# Patient Record
Sex: FEMALE | Race: ASIAN | NOT HISPANIC OR LATINO | URBAN - METROPOLITAN AREA
[De-identification: names, ages, dates, MRNs, and addresses within clinical notes are randomized per-mention and may not be internally consistent; named-entity substitution may affect disease eponyms.]

---

## 2021-01-16 ENCOUNTER — EMERGENCY (EMERGENCY)
Facility: HOSPITAL | Age: 51
LOS: 1 days | Discharge: HOME | End: 2021-01-16
Attending: EMERGENCY MEDICINE
Payer: MEDICAID

## 2021-01-16 VITALS
WEIGHT: 167.55 LBS | TEMPERATURE: 98 F | DIASTOLIC BLOOD PRESSURE: 74 MMHG | RESPIRATION RATE: 18 BRPM | SYSTOLIC BLOOD PRESSURE: 145 MMHG | HEART RATE: 83 BPM | OXYGEN SATURATION: 98 %

## 2021-01-16 VITALS
DIASTOLIC BLOOD PRESSURE: 69 MMHG | HEART RATE: 76 BPM | OXYGEN SATURATION: 100 % | SYSTOLIC BLOOD PRESSURE: 132 MMHG | RESPIRATION RATE: 17 BRPM | TEMPERATURE: 98 F

## 2021-01-16 PROCEDURE — 96372 THER/PROPH/DIAG INJ SC/IM: CPT

## 2021-01-16 PROCEDURE — 99283 EMERGENCY DEPT VISIT LOW MDM: CPT

## 2021-01-16 PROCEDURE — 99284 EMERGENCY DEPT VISIT MOD MDM: CPT | Mod: 25

## 2021-01-16 RX ORDER — PENICILLIN V POTASSIUM 250 MG
1 TABLET ORAL
Qty: 21 | Refills: 0
Start: 2021-01-16 | End: 2021-01-22

## 2021-01-16 RX ORDER — FAMOTIDINE 10 MG/ML
40 INJECTION INTRAVENOUS ONCE
Refills: 0 | Status: COMPLETED | OUTPATIENT
Start: 2021-01-16 | End: 2021-01-16

## 2021-01-16 RX ORDER — MORPHINE SULFATE 50 MG/1
4 CAPSULE, EXTENDED RELEASE ORAL ONCE
Refills: 0 | Status: DISCONTINUED | OUTPATIENT
Start: 2021-01-16 | End: 2021-01-16

## 2021-01-16 RX ORDER — IBUPROFEN 200 MG
1 TABLET ORAL
Qty: 15 | Refills: 0
Start: 2021-01-16 | End: 2021-01-20

## 2021-01-16 RX ORDER — HYDROMORPHONE HYDROCHLORIDE 2 MG/ML
1 INJECTION INTRAMUSCULAR; INTRAVENOUS; SUBCUTANEOUS ONCE
Refills: 0 | Status: DISCONTINUED | OUTPATIENT
Start: 2021-01-16 | End: 2021-01-16

## 2021-01-16 RX ADMIN — MORPHINE SULFATE 4 MILLIGRAM(S): 50 CAPSULE, EXTENDED RELEASE ORAL at 11:51

## 2021-01-16 RX ADMIN — HYDROMORPHONE HYDROCHLORIDE 1 MILLIGRAM(S): 2 INJECTION INTRAMUSCULAR; INTRAVENOUS; SUBCUTANEOUS at 12:46

## 2021-01-16 NOTE — ED PROVIDER NOTE - CLINICAL SUMMARY MEDICAL DECISION MAKING FREE TEXT BOX
50 year old female presenting to the ED with complaints of right lower molar pain. Will order penicillin and pain management.

## 2021-01-16 NOTE — ED PROVIDER NOTE - PATIENT PORTAL LINK FT
You can access the FollowMyHealth Patient Portal offered by [facility] by registering at the following website: [email or web address]. By joining [employer]’s FollowMyHealth portal, you will also be able to view your health information using other applications (apps) compatible with our system. [phone]

## 2021-01-16 NOTE — ED PROVIDER NOTE - OBJECTIVE STATEMENT
50 year old female presenting to the ED with complaints of severe tooth pain today. She reports that she has two molars on the right lower jaw that are root canals which suddenly began having severe pain. She has tried to get in touch with her dentist but has been unable to get in contact. Denies fever, chills, cough, or any other acute complaints.

## 2021-01-16 NOTE — ED ADULT NURSE NOTE - NSIMPLEMENTINTERV_GEN_ALL_ED
Implemented All Universal Safety Interventions:  [place] to call system. Call bell, personal items and telephone within reach. Instruct patient to call for assistance. Room bathroom lighting operational. Non-slip footwear when patient is off stretcher. Physically safe environment: no spills, clutter or unnecessary equipment. Stretcher in lowest position, wheels locked, appropriate side rails in place.

## 2022-04-26 ENCOUNTER — EMERGENCY (EMERGENCY)
Facility: HOSPITAL | Age: 52
LOS: 1 days | Discharge: HOME | End: 2022-04-26
Attending: EMERGENCY MEDICINE
Payer: MEDICAID

## 2022-04-26 VITALS
TEMPERATURE: 99 F | HEART RATE: 107 BPM | OXYGEN SATURATION: 98 % | SYSTOLIC BLOOD PRESSURE: 127 MMHG | DIASTOLIC BLOOD PRESSURE: 86 MMHG | RESPIRATION RATE: 16 BRPM | WEIGHT: 156.97 LBS

## 2022-04-26 VITALS
SYSTOLIC BLOOD PRESSURE: 134 MMHG | RESPIRATION RATE: 18 BRPM | OXYGEN SATURATION: 96 % | TEMPERATURE: 100 F | HEART RATE: 106 BPM | DIASTOLIC BLOOD PRESSURE: 80 MMHG

## 2022-04-26 PROBLEM — Z78.9 OTHER SPECIFIED HEALTH STATUS: Chronic | Status: ACTIVE | Noted: 2021-01-29

## 2022-04-26 LAB
ALBUMIN SERPL ELPH-MCNC: 3.1 G/DL — LOW (ref 3.5–5)
ALP SERPL-CCNC: 62 U/L — SIGNIFICANT CHANGE UP (ref 40–120)
ALT FLD-CCNC: 30 U/L DA — SIGNIFICANT CHANGE UP (ref 10–60)
ANION GAP SERPL CALC-SCNC: 6 MMOL/L — SIGNIFICANT CHANGE UP (ref 5–17)
APPEARANCE UR: CLEAR — SIGNIFICANT CHANGE UP
AST SERPL-CCNC: 23 U/L — SIGNIFICANT CHANGE UP (ref 10–40)
BASOPHILS # BLD AUTO: 0.02 K/UL — SIGNIFICANT CHANGE UP (ref 0–0.2)
BASOPHILS NFR BLD AUTO: 0.3 % — SIGNIFICANT CHANGE UP (ref 0–2)
BILIRUB SERPL-MCNC: 0.5 MG/DL — SIGNIFICANT CHANGE UP (ref 0.2–1.2)
BILIRUB UR-MCNC: NEGATIVE — SIGNIFICANT CHANGE UP
BUN SERPL-MCNC: 10 MG/DL — SIGNIFICANT CHANGE UP (ref 7–18)
CALCIUM SERPL-MCNC: 9.4 MG/DL — SIGNIFICANT CHANGE UP (ref 8.4–10.5)
CHLORIDE SERPL-SCNC: 104 MMOL/L — SIGNIFICANT CHANGE UP (ref 96–108)
CO2 SERPL-SCNC: 26 MMOL/L — SIGNIFICANT CHANGE UP (ref 22–31)
COLOR SPEC: YELLOW — SIGNIFICANT CHANGE UP
CREAT SERPL-MCNC: 0.74 MG/DL — SIGNIFICANT CHANGE UP (ref 0.5–1.3)
DIFF PNL FLD: ABNORMAL
EGFR: 98 ML/MIN/1.73M2 — SIGNIFICANT CHANGE UP
EOSINOPHIL # BLD AUTO: 0.01 K/UL — SIGNIFICANT CHANGE UP (ref 0–0.5)
EOSINOPHIL NFR BLD AUTO: 0.1 % — SIGNIFICANT CHANGE UP (ref 0–6)
GLUCOSE SERPL-MCNC: 161 MG/DL — HIGH (ref 70–99)
GLUCOSE UR QL: NEGATIVE — SIGNIFICANT CHANGE UP
HCG SERPL-ACNC: <1 MIU/ML — SIGNIFICANT CHANGE UP
HCT VFR BLD CALC: 32.2 % — LOW (ref 34.5–45)
HGB BLD-MCNC: 10.3 G/DL — LOW (ref 11.5–15.5)
IMM GRANULOCYTES NFR BLD AUTO: 0.7 % — SIGNIFICANT CHANGE UP (ref 0–1.5)
KETONES UR-MCNC: NEGATIVE — SIGNIFICANT CHANGE UP
LEUKOCYTE ESTERASE UR-ACNC: NEGATIVE — SIGNIFICANT CHANGE UP
LYMPHOCYTES # BLD AUTO: 1.11 K/UL — SIGNIFICANT CHANGE UP (ref 1–3.3)
LYMPHOCYTES # BLD AUTO: 14.6 % — SIGNIFICANT CHANGE UP (ref 13–44)
MCHC RBC-ENTMCNC: 25.8 PG — LOW (ref 27–34)
MCHC RBC-ENTMCNC: 32 GM/DL — SIGNIFICANT CHANGE UP (ref 32–36)
MCV RBC AUTO: 80.5 FL — SIGNIFICANT CHANGE UP (ref 80–100)
MONOCYTES # BLD AUTO: 0.7 K/UL — SIGNIFICANT CHANGE UP (ref 0–0.9)
MONOCYTES NFR BLD AUTO: 9.2 % — SIGNIFICANT CHANGE UP (ref 2–14)
NEUTROPHILS # BLD AUTO: 5.73 K/UL — SIGNIFICANT CHANGE UP (ref 1.8–7.4)
NEUTROPHILS NFR BLD AUTO: 75.1 % — SIGNIFICANT CHANGE UP (ref 43–77)
NITRITE UR-MCNC: NEGATIVE — SIGNIFICANT CHANGE UP
NRBC # BLD: 0 /100 WBCS — SIGNIFICANT CHANGE UP (ref 0–0)
PH UR: 5 — SIGNIFICANT CHANGE UP (ref 5–8)
PLATELET # BLD AUTO: 203 K/UL — SIGNIFICANT CHANGE UP (ref 150–400)
POTASSIUM SERPL-MCNC: 3.4 MMOL/L — LOW (ref 3.5–5.3)
POTASSIUM SERPL-SCNC: 3.4 MMOL/L — LOW (ref 3.5–5.3)
PROT SERPL-MCNC: 7.5 G/DL — SIGNIFICANT CHANGE UP (ref 6–8.3)
PROT UR-MCNC: 100
RAPID RVP RESULT: SIGNIFICANT CHANGE UP
RBC # BLD: 4 M/UL — SIGNIFICANT CHANGE UP (ref 3.8–5.2)
RBC # FLD: 14.4 % — SIGNIFICANT CHANGE UP (ref 10.3–14.5)
SARS-COV-2 RNA SPEC QL NAA+PROBE: SIGNIFICANT CHANGE UP
SODIUM SERPL-SCNC: 136 MMOL/L — SIGNIFICANT CHANGE UP (ref 135–145)
SP GR SPEC: 1.02 — SIGNIFICANT CHANGE UP (ref 1.01–1.02)
UROBILINOGEN FLD QL: NEGATIVE — SIGNIFICANT CHANGE UP
WBC # BLD: 7.62 K/UL — SIGNIFICANT CHANGE UP (ref 3.8–10.5)
WBC # FLD AUTO: 7.62 K/UL — SIGNIFICANT CHANGE UP (ref 3.8–10.5)

## 2022-04-26 PROCEDURE — 0225U NFCT DS DNA&RNA 21 SARSCOV2: CPT

## 2022-04-26 PROCEDURE — 99285 EMERGENCY DEPT VISIT HI MDM: CPT | Mod: 25

## 2022-04-26 PROCEDURE — 71046 X-RAY EXAM CHEST 2 VIEWS: CPT | Mod: 26

## 2022-04-26 PROCEDURE — 36415 COLL VENOUS BLD VENIPUNCTURE: CPT

## 2022-04-26 PROCEDURE — 71046 X-RAY EXAM CHEST 2 VIEWS: CPT

## 2022-04-26 PROCEDURE — 87086 URINE CULTURE/COLONY COUNT: CPT

## 2022-04-26 PROCEDURE — 87040 BLOOD CULTURE FOR BACTERIA: CPT

## 2022-04-26 PROCEDURE — 96374 THER/PROPH/DIAG INJ IV PUSH: CPT | Mod: XU

## 2022-04-26 PROCEDURE — 74177 CT ABD & PELVIS W/CONTRAST: CPT | Mod: MA

## 2022-04-26 PROCEDURE — 84702 CHORIONIC GONADOTROPIN TEST: CPT

## 2022-04-26 PROCEDURE — 85025 COMPLETE CBC W/AUTO DIFF WBC: CPT

## 2022-04-26 PROCEDURE — 81001 URINALYSIS AUTO W/SCOPE: CPT

## 2022-04-26 PROCEDURE — 74177 CT ABD & PELVIS W/CONTRAST: CPT | Mod: 26,MA

## 2022-04-26 PROCEDURE — 99285 EMERGENCY DEPT VISIT HI MDM: CPT

## 2022-04-26 PROCEDURE — 80053 COMPREHEN METABOLIC PANEL: CPT

## 2022-04-26 RX ORDER — MORPHINE SULFATE 50 MG/1
4 CAPSULE, EXTENDED RELEASE ORAL ONCE
Refills: 0 | Status: DISCONTINUED | OUTPATIENT
Start: 2022-04-26 | End: 2022-04-26

## 2022-04-26 RX ORDER — AZITHROMYCIN 500 MG/1
1 TABLET, FILM COATED ORAL
Qty: 6 | Refills: 0
Start: 2022-04-26 | End: 2022-04-30

## 2022-04-26 RX ORDER — ACETAMINOPHEN 500 MG
2 TABLET ORAL
Qty: 30 | Refills: 0
Start: 2022-04-26

## 2022-04-26 RX ORDER — IBUPROFEN 200 MG
1 TABLET ORAL
Qty: 20 | Refills: 0
Start: 2022-04-26

## 2022-04-26 RX ADMIN — MORPHINE SULFATE 4 MILLIGRAM(S): 50 CAPSULE, EXTENDED RELEASE ORAL at 11:33

## 2022-04-26 NOTE — ED PROVIDER NOTE - NSFOLLOWUPINSTRUCTIONS_ED_ALL_ED_FT
Community-Acquired Pneumonia, Adult      Pneumonia is a lung infection that causes inflammation and the buildup of mucus and fluids in the lungs. This may cause coughing and difficulty breathing. Community-acquired pneumonia is pneumonia that develops in people who are not, and have not recently been, in a hospital or other health care facility.    Usually, pneumonia develops as a result of an illness that is caused by a virus, such as the common cold and the flu (influenza). It can also be caused by bacteria or fungi. While the common cold and influenza can pass from person to person (are contagious), pneumonia itself is not considered contagious.      What are the causes?     This condition may be caused by:  •Viruses.      •Bacteria.      •Fungi, such as molds or mushrooms.        What increases the risk?    The following factors may make you more likely to develop this condition:•Having certain medical conditions, such as:  •A long-term (chronic) disease, which may include chronic obstructive pulmonary disease (COPD), asthma, heart failure, cystic fibrosis, diabetes, kidney disease, sickle cell disease, and human immunodeficiency virus (HIV).      •A condition that increases the risk of breathing in (aspirating) mucus and other fluids from your mouth and nose.      •A weakened body defense system (immune system).        •Having had your spleen removed (splenectomy). The spleen is the organ that helps fight germs and infections.      •Not cleaning your teeth and gums well (poor dental hygiene).      •Using tobacco products.      •Traveling to places where germs that cause pneumonia are present.      •Being near certain animals, or animal habitats, that have germs that cause pneumonia.      •Being older than 65 years of age.        What are the signs or symptoms?    Symptoms of this condition include:  •A dry cough or a wet (productive) cough.      •A fever.      •Sweating or chills.      •Chest pain, especially when breathing deeply or coughing.      •Fast breathing, difficulty breathing, or shortness of breath.      •Tiredness (fatigue).      •Muscle aches.        How is this diagnosed?     This condition may be diagnosed based on your medical history or a physical exam. You may also have tests, including:  •Chest X-rays.      •Tests of the level of oxygen and other gases in your blood.    •Tests of:  •Your blood.      •Mucus from your lungs (sputum).      •Fluid around your lungs (pleural fluid).      •Your urine.        If your pneumonia is severe, other tests may be done to learn more about the cause.      How is this treated?    Treatment for this condition depends on many factors, such as the cause of your pneumonia, your medicines, and other medical conditions that you have.    For most adults, pneumonia may be treated at home. In some cases, treatment must happen in a hospital and may include:•Medicines that are given by mouth (orally) or through an IV, including:  •Antibiotic medicines, if bacteria caused the pneumonia.      •Medicines that kill viruses (antiviral medicines), if a virus caused the pneumonia.        •Oxygen therapy.      Severe pneumonia, although rare, may require the following treatments:  •Mechanical ventilation.This procedure uses a machine to help you breathe if you cannot breathe well on your own or maintain a safe level of blood oxygen.      •Thoracentesis. This procedure removes any buildup of pleural fluid to help with breathing.        Follow these instructions at home:     Medicines     •Take over-the-counter and prescription medicines only as told by your health care provider.      •Take cough medicine only if you have trouble sleeping. Cough medicine can prevent your body from removing mucus from your lungs.      •If you were prescribed an antibiotic medicine, take it as told by your health care provider. Do not stop taking the antibiotic even if you start to feel better.        Lifestyle                   • Do not drink alcohol.      • Do not use any products that contain nicotine or tobacco, such as cigarettes, e-cigarettes, and chewing tobacco. If you need help quitting, ask your health care provider.      •Eat a healthy diet. This includes plenty of vegetables, fruits, whole grains, low-fat dairy products, and lean protein.      General instructions     •Rest a lot and get at least 8 hours of sleep each night.      •Sleep in a partly upright position at night. Place a few pillows under your head or sleep in a reclining chair.      •Return to your normal activities as told by your health care provider. Ask your health care provider what activities are safe for you.      •Drink enough fluid to keep your urine pale yellow. This helps to thin the mucus in your lungs.      •If your throat is sore, gargle with a salt–water mixture 3–4 times a day or as needed. To make a salt–water mixture, completely dissolve ½–1 tsp (3–6 g) of salt in 1 cup (237 mL) of warm water.      •Keep all follow-up visits as told by your health care provider. This is important.        How is this prevented?    You can lower your risk of developing community-acquired pneumonia by:•Getting the pneumonia vaccine. There are different types and schedules of pneumonia vaccines. Ask your health care provider which option is best for you. Consider getting the pneumonia vaccine if:  •You are older than 65 years of age.      •You are 19–65 years of age and are receiving cancer treatment, have chronic lung disease, or have other medical conditions that affect your immune system. Ask your health care provider if this applies to you.        •Getting your influenza vaccine every year. Ask your health care provider which type of vaccine is best for you.      •Getting regular dental checkups.      •Washing your hands often with soap and water for at least 20 seconds. If soap and water are not available, use hand .        Contact a health care provider if you have:    •A fever.      •Trouble sleeping because you cannot control your cough with cough medicine.        Get help right away if:    •Your shortness of breath becomes worse.      •Your chest pain increases.      •Your sickness becomes worse, especially if you are an older adult or have a weak immune system.      •You cough up blood.      These symptoms may represent a serious problem that is an emergency. Do not wait to see if the symptoms will go away. Get medical help right away. Call your local emergency services (911 in the [place] Do not drive yourself to the hospital.       Summary    •Pneumonia is an infection of the lungs.      •Community-acquired pneumonia develops in people who have not been in the hospital. It can be caused by bacteria, viruses, or fungi.      •This condition may be treated with antibiotics or antiviral medicines.      •Severe pneumonia may require a hospital stay and treatment to help with breathing.      This information is not intended to replace advice given to you by your health care provider. Make sure you discuss any questions you have with your health care provider.      Document Revised: 09/29/2020 Document Reviewed: 09/29/2020    Elsevier Patient Education © 2022 [employer]

## 2022-04-26 NOTE — ED PROVIDER NOTE - PATIENT PORTAL LINK FT
You can access the FollowMyHealth Patient Portal offered by [facility] by registering at the following website: [email or web address]. By joining [email or web address]’s FollowMyHealth portal, you will also be able to view your health information using other applications (apps) compatible with our system.

## 2022-04-26 NOTE — ED PROVIDER NOTE - PROGRESS NOTE DETAILS
Pt improved.  PNA seen on X-ray and CT so will start azithromycin.  No shortness of breath and VSS on room air.  Advised strict return precautions and PMD f/u.  Per her request, prescribed acetaminophen and ibuprofen.

## 2022-04-26 NOTE — ED ADULT NURSE NOTE - OBJECTIVE STATEMENT
Pt. c/o fever since Friday with some abdominal pain. Pt. denies cough. at home COVID test came back negative.

## 2022-04-26 NOTE — ED PROVIDER NOTE - OBJECTIVE STATEMENT
51 year old female with no PMHx presenting to the ED with chief complaint of fever for the past 5 days. Patient states that her fever peaked at a 103F over the 5 days and that she checked for COVID-19 at home but was negative. She feels general weakness, mild cough, and mild headache. Patient took Ibuprofen at 4am but not tylenol today but without help. She doesn't have any recent sick contacts or recent intake of antibiotics.  Patient denies vomiting, diarrhea, dysuria, shortness of breath, chest pain, and neck stiffness.  NKDA.

## 2022-04-26 NOTE — ED PROVIDER NOTE - CLINICAL SUMMARY MEDICAL DECISION MAKING FREE TEXT BOX
51 year old female presenting with 5 days of fever. Will give labs, chest X-ray, urinalysis, RVP, and reassess.
n/a

## 2022-04-30 ENCOUNTER — EMERGENCY (EMERGENCY)
Facility: HOSPITAL | Age: 52
LOS: 1 days | Discharge: HOME | End: 2022-04-30
Attending: EMERGENCY MEDICINE
Payer: MEDICAID

## 2022-04-30 VITALS
DIASTOLIC BLOOD PRESSURE: 95 MMHG | SYSTOLIC BLOOD PRESSURE: 146 MMHG | HEIGHT: 63 IN | RESPIRATION RATE: 18 BRPM | TEMPERATURE: 98 F | HEART RATE: 96 BPM | WEIGHT: 158.29 LBS | OXYGEN SATURATION: 97 %

## 2022-04-30 VITALS
SYSTOLIC BLOOD PRESSURE: 153 MMHG | RESPIRATION RATE: 18 BRPM | DIASTOLIC BLOOD PRESSURE: 89 MMHG | OXYGEN SATURATION: 97 % | HEART RATE: 87 BPM | TEMPERATURE: 98 F

## 2022-04-30 LAB
ALBUMIN SERPL ELPH-MCNC: 3.2 G/DL — LOW (ref 3.5–5)
ALP SERPL-CCNC: 70 U/L — SIGNIFICANT CHANGE UP (ref 40–120)
ALT FLD-CCNC: 34 U/L DA — SIGNIFICANT CHANGE UP (ref 10–60)
ANION GAP SERPL CALC-SCNC: 4 MMOL/L — LOW (ref 5–17)
AST SERPL-CCNC: 23 U/L — SIGNIFICANT CHANGE UP (ref 10–40)
BASOPHILS # BLD AUTO: 0.04 K/UL — SIGNIFICANT CHANGE UP (ref 0–0.2)
BASOPHILS NFR BLD AUTO: 0.6 % — SIGNIFICANT CHANGE UP (ref 0–2)
BILIRUB SERPL-MCNC: 0.3 MG/DL — SIGNIFICANT CHANGE UP (ref 0.2–1.2)
BUN SERPL-MCNC: 11 MG/DL — SIGNIFICANT CHANGE UP (ref 7–18)
CALCIUM SERPL-MCNC: 9.6 MG/DL — SIGNIFICANT CHANGE UP (ref 8.4–10.5)
CHLORIDE SERPL-SCNC: 105 MMOL/L — SIGNIFICANT CHANGE UP (ref 96–108)
CO2 SERPL-SCNC: 31 MMOL/L — SIGNIFICANT CHANGE UP (ref 22–31)
CREAT SERPL-MCNC: 0.72 MG/DL — SIGNIFICANT CHANGE UP (ref 0.5–1.3)
CULTURE RESULTS: NO GROWTH — SIGNIFICANT CHANGE UP
D DIMER BLD IA.RAPID-MCNC: 380 NG/ML DDU — HIGH
EGFR: 102 ML/MIN/1.73M2 — SIGNIFICANT CHANGE UP
EOSINOPHIL # BLD AUTO: 0.18 K/UL — SIGNIFICANT CHANGE UP (ref 0–0.5)
EOSINOPHIL NFR BLD AUTO: 2.5 % — SIGNIFICANT CHANGE UP (ref 0–6)
FLUAV AG NPH QL: SIGNIFICANT CHANGE UP
FLUBV AG NPH QL: SIGNIFICANT CHANGE UP
GLUCOSE SERPL-MCNC: 112 MG/DL — HIGH (ref 70–99)
HCG SERPL-ACNC: <1 MIU/ML — SIGNIFICANT CHANGE UP
HCT VFR BLD CALC: 34.8 % — SIGNIFICANT CHANGE UP (ref 34.5–45)
HGB BLD-MCNC: 10.8 G/DL — LOW (ref 11.5–15.5)
IMM GRANULOCYTES NFR BLD AUTO: 4.5 % — HIGH (ref 0–1.5)
LYMPHOCYTES # BLD AUTO: 1.91 K/UL — SIGNIFICANT CHANGE UP (ref 1–3.3)
LYMPHOCYTES # BLD AUTO: 26.9 % — SIGNIFICANT CHANGE UP (ref 13–44)
MCHC RBC-ENTMCNC: 25.2 PG — LOW (ref 27–34)
MCHC RBC-ENTMCNC: 31 GM/DL — LOW (ref 32–36)
MCV RBC AUTO: 81.1 FL — SIGNIFICANT CHANGE UP (ref 80–100)
MONOCYTES # BLD AUTO: 0.42 K/UL — SIGNIFICANT CHANGE UP (ref 0–0.9)
MONOCYTES NFR BLD AUTO: 5.9 % — SIGNIFICANT CHANGE UP (ref 2–14)
NEUTROPHILS # BLD AUTO: 4.24 K/UL — SIGNIFICANT CHANGE UP (ref 1.8–7.4)
NEUTROPHILS NFR BLD AUTO: 59.6 % — SIGNIFICANT CHANGE UP (ref 43–77)
NRBC # BLD: 0 /100 WBCS — SIGNIFICANT CHANGE UP (ref 0–0)
PLATELET # BLD AUTO: 344 K/UL — SIGNIFICANT CHANGE UP (ref 150–400)
POTASSIUM SERPL-MCNC: 3.6 MMOL/L — SIGNIFICANT CHANGE UP (ref 3.5–5.3)
POTASSIUM SERPL-SCNC: 3.6 MMOL/L — SIGNIFICANT CHANGE UP (ref 3.5–5.3)
PROT SERPL-MCNC: 8.1 G/DL — SIGNIFICANT CHANGE UP (ref 6–8.3)
RBC # BLD: 4.29 M/UL — SIGNIFICANT CHANGE UP (ref 3.8–5.2)
RBC # FLD: 14.3 % — SIGNIFICANT CHANGE UP (ref 10.3–14.5)
SARS-COV-2 RNA SPEC QL NAA+PROBE: SIGNIFICANT CHANGE UP
SODIUM SERPL-SCNC: 140 MMOL/L — SIGNIFICANT CHANGE UP (ref 135–145)
SPECIMEN SOURCE: SIGNIFICANT CHANGE UP
TROPONIN I, HIGH SENSITIVITY RESULT: 3.1 NG/L — SIGNIFICANT CHANGE UP
WBC # BLD: 7.11 K/UL — SIGNIFICANT CHANGE UP (ref 3.8–10.5)
WBC # FLD AUTO: 7.11 K/UL — SIGNIFICANT CHANGE UP (ref 3.8–10.5)

## 2022-04-30 PROCEDURE — 84484 ASSAY OF TROPONIN QUANT: CPT

## 2022-04-30 PROCEDURE — 87637 SARSCOV2&INF A&B&RSV AMP PRB: CPT

## 2022-04-30 PROCEDURE — 93005 ELECTROCARDIOGRAM TRACING: CPT

## 2022-04-30 PROCEDURE — 85025 COMPLETE CBC W/AUTO DIFF WBC: CPT

## 2022-04-30 PROCEDURE — 71275 CT ANGIOGRAPHY CHEST: CPT | Mod: MA

## 2022-04-30 PROCEDURE — 36415 COLL VENOUS BLD VENIPUNCTURE: CPT

## 2022-04-30 PROCEDURE — 85379 FIBRIN DEGRADATION QUANT: CPT

## 2022-04-30 PROCEDURE — 84702 CHORIONIC GONADOTROPIN TEST: CPT

## 2022-04-30 PROCEDURE — 71275 CT ANGIOGRAPHY CHEST: CPT | Mod: 26,MA

## 2022-04-30 PROCEDURE — 99285 EMERGENCY DEPT VISIT HI MDM: CPT | Mod: 25

## 2022-04-30 PROCEDURE — 80053 COMPREHEN METABOLIC PANEL: CPT

## 2022-04-30 PROCEDURE — 99285 EMERGENCY DEPT VISIT HI MDM: CPT

## 2022-04-30 NOTE — ED PROVIDER NOTE - PROGRESS NOTE DETAILS
(Susan) s/o to fu CTA  CTA - w/o PE; continued PNA  Dc w/ c/w abx and PCP fu  Discussed indications for patient return to ED. Patient understood.

## 2022-04-30 NOTE — ED PROVIDER NOTE - PATIENT PORTAL LINK FT
You can access the FollowMyHealth Patient Portal offered by [facility] by registering at the following website: [email or web address]. By joining [employer]’s FollowMyHealth portal, you will also be able to view your health information using other applications (apps) compatible with our system.

## 2022-04-30 NOTE — ED ADULT NURSE NOTE - OBJECTIVE STATEMENT
Pt arrived to ED accompanied by daughter, who states her mom was seen here on Tuesday for cough and SOB   Since Thursday, started coughing up blood  Pt states has shortness of breath when coughing only , no more fever now, came to ED worried for bloody cough  Denies recent travel outside the country or sick contacts

## 2022-04-30 NOTE — ED PROVIDER NOTE - PHYSICAL EXAMINATION
General: pt lying in stretcher, appears stated age and is not in distress  HEENT: AT/NC, pink conjunctiva, anicteric sclerae, EOMI, PERRLA, TMs smooth, grey, intact, with normal landmarks, nasal mucosa pink, no discharge, turbinates not enlarged; moist mucus membranes, tongue well-papillated, good dentition; posterior pharynx shows no erythema or exudates;   Neck: supple, full ROM, trachea midline, no JVD, no cervical LAD, no midline ttp or stepoffs  Lungs: symmetric excursion, b/l clear vesicular breath sounds with no wheezes, crackles, or rhonchi  Heart: rrr, S1, S2 normal; no S3 or S4; no murmurs or rubs  Abd: normal bowel sounds; soft, nontender; negative McBurney's point tenderness, negative Deleon's sign, no rebound, no guarding, spleen non-palpable; no hepatomegaly, no masses  Back: no midline spinal tenderness or stepoffs, no costovertebral angle tenderness  Extremities: no clubbing, cyanosis, or edema; no palpable deformities or fractures  Skin: good turgor; no rashes, petechiae, ecchymoses, or jaundice  Pulses: radial, posterior tibialis (PT), dorsalis pedis (DP) all 2+ & symmetric  Neuro: awake, alert, responsive; oriented to person, place and time; cranial nerves intact, EOMI, intact jaw movement, intact facial sensation, no facial asymmetry, hearing intact; no nystagmus, tongue midline; Motor: Normal tone in upper and lower extremities bilaterally strength 5/5; Sensory: intact to pinprick and light touch; Cerebellar: finger-to-nose intact; normal steady gait; negative Romberg’s sign; DTR: biceps, triceps, patellar, 2+, no pronator drift General: pt lying in stretcher, appears stated age and is not in distress  HEENT: AT/NC, pink conjunctiva, anicteric sclerae, EOMI, PERRLA, mmm  Neck: supple, full ROM, trachea midline, no JVD, no cervical LAD, no midline ttp or stepoffs  Lungs: symmetric excursion, b/l clear vesicular breath sounds with no wheezes, crackles, or rhonchi  Heart: rrr, S1, S2 normal; no S3 or S4; no murmurs or rubs  Abd: normal bowel sounds; soft, nontender; negative McBurney's point tenderness, negative Deleon's sign, no rebound, no guarding, spleen non-palpable; no hepatomegaly, no masses  Back: no midline spinal tenderness or stepoffs, no costovertebral angle tenderness  Extremities: no clubbing, cyanosis, or edema; no palpable deformities or fractures  Skin: good turgor; no rashes, petechiae, ecchymoses, or jaundice  Pulses: radial, posterior tibialis (PT), dorsalis pedis (DP) all 2+ & symmetric  Neuro: awake, alert, responsive; oriented to person, place and time; cranial nerves intact, EOMI, intact jaw movement, intact facial sensation, no facial asymmetry, hearing intact; ; Motor: Normal tone in upper and lower extremities bilaterally strength 5/5; Sensory: intact

## 2022-04-30 NOTE — ED PROVIDER NOTE - CLINICAL SUMMARY MEDICAL DECISION MAKING FREE TEXT BOX
Pt w/ aforementioned presentation concerning for but not limited to worsened pna, PE   Will get labs, imaging, treat symptoms, monitor and reassess.    Pt signed out to Dr. Davis pending CT angio to r/o PE

## 2022-05-01 LAB
CULTURE RESULTS: SIGNIFICANT CHANGE UP
CULTURE RESULTS: SIGNIFICANT CHANGE UP
SPECIMEN SOURCE: SIGNIFICANT CHANGE UP
SPECIMEN SOURCE: SIGNIFICANT CHANGE UP

## 2022-09-23 ENCOUNTER — EMERGENCY (EMERGENCY)
Facility: HOSPITAL | Age: 52
LOS: 1 days | Discharge: HOME | End: 2022-09-23
Admitting: EMERGENCY MEDICINE

## 2022-09-23 VITALS
OXYGEN SATURATION: 100 % | DIASTOLIC BLOOD PRESSURE: 83 MMHG | RESPIRATION RATE: 20 BRPM | TEMPERATURE: 98 F | HEART RATE: 74 BPM | SYSTOLIC BLOOD PRESSURE: 123 MMHG

## 2022-09-23 PROCEDURE — 99284 EMERGENCY DEPT VISIT MOD MDM: CPT

## 2022-09-23 RX ORDER — IBUPROFEN 200 MG
1 TABLET ORAL
Qty: 6 | Refills: 0
Start: 2022-09-23 | End: 2022-09-24

## 2022-09-23 RX ORDER — OXYCODONE AND ACETAMINOPHEN 5; 325 MG/1; MG/1
1 TABLET ORAL
Qty: 8 | Refills: 0
Start: 2022-09-23 | End: 2022-09-24

## 2022-09-23 NOTE — ED PROVIDER NOTE - ENMT, MLM
Airway patent, Nasal mucosa clear. Mouth with normal mucosa. Throat has no vesicles, no oropharyngeal exudates and uvula is midline. No LAD. No facial swelling.

## 2022-09-23 NOTE — CONSULT NOTE ADULT - SUBJECTIVE AND OBJECTIVE BOX
Patient is a 51y old  Female who presents with a chief complaint of LL dental pain.    HPI: Pt presented to [facility] ED with LL dental pain. Pt reports pain started about a month ago after she saw a dentist for a cleaning. Pt reports dentist cleaned under her bridge on the LL after which she began feeling pain on biting and throbbing.      PAST MEDICAL & SURGICAL HISTORY: Anemia    No significant past surgical history    MEDICATIONS  (STANDING): Ferrous sulfate      Allergies NKDA      Vital Signs Last 24 Hrs  T(C): 36.8 (23 Sep 2022 12:28), Max: 36.8 (23 Sep 2022 12:28)  T(F): 98.2 (23 Sep 2022 12:28), Max: 98.2 (23 Sep 2022 12:28)  HR: 74 (23 Sep 2022 12:28) (74 - 74)  BP: 123/83 (23 Sep 2022 12:28) (123/83 - 123/83)  BP(mean): --  RR: 20 (23 Sep 2022 12:28) (20 - 20)  SpO2: 100% (23 Sep 2022 12:28) (100% - 100%)    Parameters below as of 23 Sep 2022 12:28  Patient On (Oxygen Delivery Method): room air      EOE:  TMJ ( - ) clicks                    ( - ) pops                    ( - ) crepitus             Mandible FROM             Facial bones and MOM grossly intact             ( - ) trismus             ( - ) LAD             ( - ) swelling             ( - ) asymmetry             ( + ) palpation - along LL mandible in area of #18-x-20 bridge             ( - ) SOB             ( - ) dysphagia             ( - ) LOC    IOE:  permanent dentition: grossly intact, partially edentulous           tongue/FOM WNL           labial/buccal mucosa WNL           ( + ) percussion - #18, #20           ( + ) palpation - #18, #20           ( - ) swelling   Bridge #18-x-20  Pt reports pain especially along tooth #18.    *DENTAL RADIOGRAPHS: Pan taken and interpreted.   Bridge - 2-x-4-5-x-7; 12-x-15;18-x-20,  #2, 4, 5, 13, 20 - RCT  #18 - incomplete RCT    ASSESSMENT: #18 incomplete RCT. Recommend that pt see outpatient dentist and/or endodontist to complete RCT on #18. Pt understood and all questions answered.     RECOMMENDATIONS:  1) Pain meds as per Med team  2) Dental F/U with outpatient dentist or endodontist to complete RCT on #18 for comprehensive dental care.   3) Dental F/U with outpatient dentist for comprehensive dental care.     Noris Díaz DDS #[id number] Patient is a 51y old  Female who presents with a chief complaint of LL dental pain.    HPI: Pt presented to [facility] ED with LL dental pain. Pt reports pain started about a month ago after she saw a dentist for a cleaning. Pt reports dentist cleaned under her bridge on the LL after which she began feeling pain on biting and throbbing.      PAST MEDICAL & SURGICAL HISTORY: Anemia    No significant past surgical history    MEDICATIONS  (STANDING): Ferrous sulfate      Allergies NKDA      Vital Signs Last 24 Hrs  T(C): 36.8 (23 Sep 2022 12:28), Max: 36.8 (23 Sep 2022 12:28)  T(F): 98.2 (23 Sep 2022 12:28), Max: 98.2 (23 Sep 2022 12:28)  HR: 74 (23 Sep 2022 12:28) (74 - 74)  BP: 123/83 (23 Sep 2022 12:28) (123/83 - 123/83)  BP(mean): --  RR: 20 (23 Sep 2022 12:28) (20 - 20)  SpO2: 100% (23 Sep 2022 12:28) (100% - 100%)    Parameters below as of 23 Sep 2022 12:28  Patient On (Oxygen Delivery Method): room air      EOE:  TMJ ( - ) clicks                    ( - ) pops                    ( - ) crepitus             Mandible FROM             Facial bones and MOM grossly intact             ( - ) trismus             ( - ) LAD             ( - ) swelling             ( - ) asymmetry             ( + ) palpation - along LL mandible in area of #18-x-20 bridge             ( - ) SOB             ( - ) dysphagia             ( - ) LOC    IOE:  permanent dentition: grossly intact, partially edentulous           tongue/FOM WNL           labial/buccal mucosa WNL           ( + ) percussion - #18, #20           ( + ) palpation - #18, #20           ( - ) swelling   Bridge #18-x-20  Pt reports pain especially along tooth #18.    *DENTAL RADIOGRAPHS: Pan taken and interpreted.   Bridge - 2-x-4-5-x-7; 12-x-15;18-x-20,  #2, 4, 5, 13, 20 - RCT  #18 - incomplete RCT    ASSESSMENT: #18 incomplete RCT. Recommend that pt see outpatient dentist and/or endodontist to complete RCT on #18. Pt understood and all questions answered.     RECOMMENDATIONS:  1) Pain meds as per Med team  2) Dental F/U with outpatient dentist or endodontist to complete RCT on #18 for comprehensive dental care.   3) Dental F/U with outpatient dentist for comprehensive dental care.     Noris Díaz DDS #[id number] Patient is a 51y old  Female who presents with a chief complaint of LL dental pain.    HPI: Pt presented to [facility] ED with LL dental pain. Pt reports pain started about a month ago after she saw a dentist for a cleaning. Pt reports dentist cleaned under her bridge on the LL after which she began feeling pain on biting and throbbing.      PAST MEDICAL & SURGICAL HISTORY: Anemia    No significant past surgical history    MEDICATIONS  (STANDING): Ferrous sulfate      Allergies NKDA      Vital Signs Last 24 Hrs  T(C): 36.8 (23 Sep 2022 12:28), Max: 36.8 (23 Sep 2022 12:28)  T(F): 98.2 (23 Sep 2022 12:28), Max: 98.2 (23 Sep 2022 12:28)  HR: 74 (23 Sep 2022 12:28) (74 - 74)  BP: 123/83 (23 Sep 2022 12:28) (123/83 - 123/83)  BP(mean): --  RR: 20 (23 Sep 2022 12:28) (20 - 20)  SpO2: 100% (23 Sep 2022 12:28) (100% - 100%)    Parameters below as of 23 Sep 2022 12:28  Patient On (Oxygen Delivery Method): room air      EOE:  TMJ ( - ) clicks                    ( - ) pops                    ( - ) crepitus             Mandible FROM             Facial bones and MOM grossly intact             ( - ) trismus             ( - ) LAD             ( - ) swelling             ( - ) asymmetry             ( + ) palpation - along LL mandible in area of #18-x-20 bridge             ( - ) SOB             ( - ) dysphagia             ( - ) LOC    IOE:  permanent dentition: grossly intact, partially edentulous           tongue/FOM WNL           labial/buccal mucosa WNL           ( + ) percussion - #18, #20           ( + ) palpation - #18, #20           ( - ) swelling   Bridge #18-x-20  Pt reports pain especially along tooth #18.    *DENTAL RADIOGRAPHS: Pan taken and interpreted.   Bridge - 2-x-4-5-x-7; 12-x-15;18-x-20,  #2, 4, 5, 13, 20 - RCT  #18 - incomplete RCT    ASSESSMENT: #18 incomplete RCT. Recommend that pt see outpatient dentist and/or endodontist to complete RCT on #18. Pt understood and all questions answered.     RECOMMENDATIONS:  1) Pain meds as per Med team  2) Dental F/U with outpatient dentist or endodontist to complete RCT on #18 for comprehensive dental care.   3) Dental F/U with outpatient dentist for comprehensive dental care.     Noris Díaz DDS #[id number]

## 2022-09-23 NOTE — ED PROVIDER NOTE - CLINICAL SUMMARY MEDICAL DECISION MAKING FREE TEXT BOX
50 y/o female with pmhx of anemia presents to ED c/o lower dental pain x few days. Pt last saw her dentist 1 month ago for a cleaning and told she had food impaction of left lower bridge placed years ago after a root canal. Pt took Tylenol this morning with minimal relief. Pt admits to mild swelling inside the mouth. no edema or abscess on exam. plan to consult dental, pain well controlled at this time deferring medication 52 y/o female with pmhx of anemia presents to ED c/o lower dental pain x few days. Pt last saw her dentist 1 month ago for a cleaning and told she had food impaction of left lower bridge placed years ago after a root canal. Pt took Tylenol this morning with minimal relief. Pt admits to mild swelling inside the mouth. no edema or abscess on exam. plan to consult dental, pain well controlled at this time deferring medication

## 2022-09-23 NOTE — ED PROVIDER NOTE - NSFOLLOWUPINSTRUCTIONS_ED_ALL_ED_FT
Follow up with your dentist  Take Percocet 325/5 once every 6 hours as needed for severe pain -- causes drowsiness; DO NOT drink alcohol, drive, or operate heavy machinery with this medication.     Worsening, continued or new concerning symptoms return to the emergency department.

## 2022-09-23 NOTE — ED PROVIDER NOTE - OBJECTIVE STATEMENT
50 y/o female with pmhx of anemia presents to ED c/o lower dental pain x few days. Pt last saw her dentist 1 month ago for a cleaning and told she had food impaction of left lower bridge placed years ago after a root canal. Pt took Tylenol this morning with minimal relief. Pt admits to mild swelling inside the mouth. No facial swelling. No fevers, chills, redness, difficulty swallowing. 52 y/o female with pmhx of anemia presents to ED c/o lower dental pain x few days. Pt last saw her dentist 1 month ago for a cleaning and told she had food impaction of left lower bridge placed years ago after a root canal. Pt took Tylenol this morning with minimal relief. Pt admits to mild swelling inside the mouth. No facial swelling. No fevers, chills, redness, difficulty swallowing.

## 2022-09-23 NOTE — ED PROVIDER NOTE - PATIENT PORTAL LINK FT
You can access the FollowMyHealth Patient Portal offered by [facility] by registering at the following website: [email or web address]. By joining [employer]’s FollowMyHealth portal, you will also be able to view your health information using other applications (apps) compatible with our system. You can access the FollowMyHealth Patient Portal offered by [facility] by registering at the following website: [email or web address]. By joining [employer]’s FollowMyHealth portal, you will also be able to view your health information using other applications (apps) compatible with our system. You can access the FollowMyHealth Patient Portal offered by [facility] by registering at the following website: [email or web address]. By joining [employer]’s FollowMyHealth portal, you will also be able to view your health information using other applications (apps) compatible with our system.

## 2023-06-30 PROBLEM — Z00.00 ENCOUNTER FOR PREVENTIVE HEALTH EXAMINATION: Status: ACTIVE | Noted: 2023-06-30

## 2023-08-17 ENCOUNTER — APPOINTMENT (OUTPATIENT)
Dept: GASTROENTEROLOGY | Facility: CLINIC | Age: 53
End: 2023-08-17

## 2023-09-05 NOTE — ED ADULT NURSE NOTE - NS ED NURSE RECORD ANOTHER HT AND WT
[Tendon Sheath] : tendon sheath [Left] : of the left [Other: ____] : [unfilled] [___ cc    6mg] :  Betamethasone (Celestone) ~Vcc of 6mg [___ cc    1%] : Lidocaine ~Vcc of 1%  [___ cc    0.25%] : Bupivacaine (Marcaine) ~Vcc of 0.25%  [] : Patient tolerated procedure well [Call if redness, pain or fever occur] : call if redness, pain or fever occur [Apply ice for 15min out of every hour for the next 12-24 hours as tolerated] : apply ice for 15 minutes out of every hour for the next 12-24 hours as tolerated [Previous OTC use and PT nontherapeutic] : patient has tried OTC's including aspirin, Ibuprofen, Aleve, etc or prescription NSAIDS, and/or exercises at home and/or physical therapy without satisfactory response [Patient had decreased mobility in the joint] : patient had decreased mobility in the joint [Risks, benefits, alternatives discussed / Verbal consent obtained] : the risks benefits, and alternatives have been discussed, and verbal consent was obtained [Prior failure or difficult injection] : prior failure or difficult injection [All ultrasound images have been permanently captured and stored accordingly in our picture archiving and communication system] : All ultrasound images have been permanently captured and stored accordingly in our picture archiving and communication system [Visualization of the needle and placement of injection was performed without complication] : visualization of the needle and placement of injection was performed without complication Yes

## 2024-05-21 NOTE — ED PROVIDER NOTE - PRINCIPAL DIAGNOSIS
Hemoptysis
Initiate Treatment: Bactrim 400 mg-80 mg tablet \\nQuantity: 28.0 Tablet\\nSig: Take one tab po BID X14 days
Detail Level: Zone
Render In Strict Bullet Format?: No

## 2024-08-13 NOTE — ED ADULT NURSE NOTE - PAIN: BODY LOCATION
Called pt, no answer; left message informing pt I was calling to remind pt of her in office EAWV on 8/15/24 and to return my call if she had any questions; left my name and number.   
[email or web address]

## 2024-11-12 ENCOUNTER — TRANSCRIPTION ENCOUNTER (OUTPATIENT)
Age: 54
End: 2024-11-12

## 2025-04-29 NOTE — ED ADULT NURSE NOTE - NS_NURSE_DISC_TEACHING_YN_ED_ALL_ED
----------------------------------------------------------------------------------------------------------  [facility]  Psychiatry Progress Note  Hospital Day #14     Interim History:     The patient's care was discussed with the treatment team and chart notes were reviewed.    Patient ID: Anastasiya Simon is a 62 year old female with a previous psychiatric diagnosis of schizophrenia and polysubstance use admitted from [facility] on 04/15/2025 due to concern for SI and psychosis in the context of psychosocial stressors including living situation and argument with family member.    Vitals: Temp: 97.6  F (36.4  C) Temp  Min: 97.6  F (36.4  C)  Max: 97.6  F (36.4  C)  SpO2: 99 % SpO2  Min: 99 %  Max: 100 %  Pulse: 86 Pulse  Min: 86  Max: 91  BP: (!) 146/80 Systolic (24hrs), Av , Min:128 , Max:167   Diastolic (24hrs), Av, Min:75, Max:82  Sleep: 4.5 hours (25 0657)  Scheduled medications: Took all scheduled medications as prescribed.  Psychiatric PRN medications:   Last 24H PRN:     hydrOXYzine HCl (ATARAX) tablet 25 mg, 25 mg at 25 0435    loperamide (IMODIUM) capsule 2 mg, 2 mg at 25 2100    melatonin tablet 3 mg, 3 mg at 25 0435    Staff Report:   AM: Pt BG before breakfast was 95 and before lunch 118. When trying to get blood sample for BG, pt kept pulling her finger away. Denies SI and AVH. Pt hopes she's not in the hospital long. Pt mumbling and getting off topic during conversation. Pt discussed getting kicked out of places and people not letting them stay with them. Pt selectively social with select peers. Ate meals in dining area. Denies diarrhea or incontinence.    PM: Alert and oriented, able to communicate needs. Visible in milieu, most time spent watching tv. Social and interactive with peers and staff members. Appeared flat but pleasant on approach. Cooperative and medication compliant. Denied anxiety or depression,  "denied SI/HI, contracted for safety. ADLs WNL, no pain/discomfort. Adequate food and fluid intake. Imodium given at HS per request for reported diarrhea. Pt complained of feeling anxious, restless, and unable to sleep. Hydroxyzine and Melatonin given-effective. Pt slept after. Pt appears disheveled. Slept for 4.5 total hours.   Blood Glucose:  4/28 1800: 116  4/28 2100: 142  4/29 0200: 135  4/29 0800: 154     Subjective:     Patient Interview:  Pt was interviewed her room. Pt was sitting in her bed.     Asked pt how she is doing. Pt reports feeling \"drowsy\" and that her hair is bothering her. Pt ate breakfast this morning. Asked if there are any concerns. She feels dehydrated. Asked about diarrhea, pt says she takes loperamide then it doesn't happen. She says diarrhea happens any time in the day. Pt reports last night she knew it was going to happen soon so she took a pill for it to prevent diarrhea. Pt took the new medication, Abilify, this morning. Asked how she is feeling, she feels fine. It may be too soon to see any side effects. Reminded her about cross tapering with the old medication, Zyprexa. Informed her it should help with sedation. She is agreeable to medication changes.     Pt asked how long it will take to knows where she will be going. Let pt know that referrals have been made and waiting for the group [facility] to respond. Pt asked the time and asked when May is. Informed her that Thursday is May 1st. She asked if we would know by Thursday, reiterated that we need to wait for the group [facility] review and decision. Pt said she can find her own place. Reminded pt that she has been committed and her  will want to find a solid plan before discharging. Pt wants to call the  or friend who will help her. Informed pt we need to get approval from the [facility] for a provisional discharge. Attending told her that we can consider her finding her own place, but [facility] might not support this. " "Informed pt that our team is looking for something more permanent. Pt confirms that everyone on the unit is treating her okay. No other questions or concerns at this time.     Pt is agreeable with care plan.   Objective:     Vitals:  BP (!) 146/80 (BP Location: Right arm)   Pulse 86   Temp 97.6  F (36.4  C) (Oral)   Resp 20   Ht 1.651 m (5' 5\")   Wt 71.7 kg (158 lb)   LMP 10/22/2013   SpO2 99%   BMI 26.29 kg/m      Allergies:  Allergies   Allergen Reactions    Tetracycline Unknown     It happened when pt was a baby       Current Medications:  Scheduled:  Current Facility-Administered Medications   Medication Dose Route Frequency Provider Last Rate Last Admin    ARIPiprazole (ABILIFY) tablet 5 mg  5 mg Oral Daily Joceline Dela Cruz MD   5 mg at 04/29/25 0748    calcium carbonate (TUMS) chewable tablet 500 mg  500 mg Oral Daily PRN Tanja Gonzalez MD   500 mg at 04/24/25 1750    glucose gel 15-30 g  15-30 g Oral Q15 Min PRN Katina Armendariz MD        Or    dextrose 50 % injection 25-50 mL  25-50 mL Intravenous Q15 Min PRN Katina Armendariz MD        Or    glucagon injection 1 mg  1 mg Subcutaneous Q15 Min PRN Katina Armendariz MD        divalproex sodium extended-release (DEPAKOTE ER) 24 hr tablet 1,000 mg  1,000 mg Oral At Bedtime Tanja Gonzalez MD   1,000 mg at 04/28/25 2101    gabapentin (NEURONTIN) capsule 300 mg  300 mg Oral TID PRN Tanja Gonzalez MD   300 mg at 04/22/25 2017    glipiZIDE (GLUCOTROL XL) 24 hr tablet 10 mg  10 mg Oral Daily with breakfast Katina Meza PA   10 mg at 04/29/25 0748    hydrOXYzine HCl (ATARAX) tablet 25 mg  25 mg Oral Q4H PRN Lucy Chandler MD   25 mg at 04/29/25 0435    insulin aspart (NovoLOG) injection (RAPID ACTING)  1-7 Units Subcutaneous TID AC Katina Meza PA   2 Units at 04/27/25 1806    insulin aspart (NovoLOG) injection (RAPID ACTING)  1-5 Units Subcutaneous At Bedtime Katina Meza PA   2 Units at " 04/26/25 2129    loperamide (IMODIUM) capsule 2 mg  2 mg Oral 4x Daily PRN Tanja Gonzalez MD   2 mg at 04/28/25 2100    magnesium sulfate (EPSOM SALT) granules 1 Tablespoonful  1 Tablespoonful Topical Daily PRN Tanja Gonzalez MD        melatonin tablet 3 mg  3 mg Oral At Bedtime PRN Katina Armendariz MD   3 mg at 04/29/25 0435    metFORMIN (GLUCOPHAGE) tablet 1,000 mg  1,000 mg Oral BID w/meals Charlotte Jasmine PA-C   1,000 mg at 04/29/25 0748    multivitamin w/minerals (THERA-VIT-M) tablet 1 tablet  1 tablet Oral Daily Charlotte Jasmine PA-C   1 tablet at 04/29/25 0748    OLANZapine (zyPREXA) tablet 10 mg  10 mg Oral TID PRN Lucy Chandler MD        Or    OLANZapine (zyPREXA) injection 10 mg  10 mg Intramuscular TID PRN Lucy Chandler MD        OLANZapine zydis (zyPREXA) ODT tab 15 mg  15 mg Oral At Bedtime Tanja Gonzalez MD   15 mg at 04/28/25 2101    OLANZapine zydis (zyPREXA) ODT tab 5 mg  5 mg Oral Daily Tanja Gonzalez MD   5 mg at 04/29/25 0748    pantoprazole (PROTONIX) EC tablet 40 mg  40 mg Oral QAM AC Lucy Chandler MD   40 mg at 04/29/25 0748    tolterodine ER (DETROL LA) 24 hr capsule 2 mg  2 mg Oral Daily Tanja Gonzalez MD   2 mg at 04/29/25 0748     PRN:  Current Facility-Administered Medications   Medication Dose Route Frequency Provider Last Rate Last Admin    ARIPiprazole (ABILIFY) tablet 5 mg  5 mg Oral Daily Joceline Dela Cruz MD   5 mg at 04/29/25 0748    calcium carbonate (TUMS) chewable tablet 500 mg  500 mg Oral Daily PRN Tanja Gonzalez MD   500 mg at 04/24/25 1750    glucose gel 15-30 g  15-30 g Oral Q15 Min PRN Katina Armendariz MD        Or    dextrose 50 % injection 25-50 mL  25-50 mL Intravenous Q15 Min PRN Katina Armendariz MD        Or    glucagon injection 1 mg  1 mg Subcutaneous Q15 Min PRN Katina Armendariz MD        divalproex sodium extended-release (DEPAKOTE ER) 24 hr tablet 1,000 mg  1,000  mg Oral At Bedtime Tanja Gonzalez MD   1,000 mg at 04/28/25 2101    gabapentin (NEURONTIN) capsule 300 mg  300 mg Oral TID PRN Tanja Gonzalez MD   300 mg at 04/22/25 2017    glipiZIDE (GLUCOTROL XL) 24 hr tablet 10 mg  10 mg Oral Daily with breakfast Katina Meza PA   10 mg at 04/29/25 0748    hydrOXYzine HCl (ATARAX) tablet 25 mg  25 mg Oral Q4H PRN Lucy Chandler MD   25 mg at 04/29/25 0435    insulin aspart (NovoLOG) injection (RAPID ACTING)  1-7 Units Subcutaneous TID AC Katina Meza PA   2 Units at 04/27/25 1806    insulin aspart (NovoLOG) injection (RAPID ACTING)  1-5 Units Subcutaneous At Bedtime Katina Meza PA   2 Units at 04/26/25 2129    loperamide (IMODIUM) capsule 2 mg  2 mg Oral 4x Daily PRN Tanja Gonzalez MD   2 mg at 04/28/25 2100    magnesium sulfate (EPSOM SALT) granules 1 Tablespoonful  1 Tablespoonful Topical Daily PRN Tajna Gonzalez MD        melatonin tablet 3 mg  3 mg Oral At Bedtime PRN Katina Armendariz MD   3 mg at 04/29/25 0435    metFORMIN (GLUCOPHAGE) tablet 1,000 mg  1,000 mg Oral BID w/meals Charlotte Jasmine PA-C   1,000 mg at 04/29/25 0748    multivitamin w/minerals (THERA-VIT-M) tablet 1 tablet  1 tablet Oral Daily Charlotte Jasmine PA-C   1 tablet at 04/29/25 0748    OLANZapine (zyPREXA) tablet 10 mg  10 mg Oral TID PRN Lucy Chandler MD        Or    OLANZapine (zyPREXA) injection 10 mg  10 mg Intramuscular TID PRN Lucy Chandler MD        OLANZapine zydis (zyPREXA) ODT tab 15 mg  15 mg Oral At Bedtime Tanja Gonzalez MD   15 mg at 04/28/25 2101    OLANZapine zydis (zyPREXA) ODT tab 5 mg  5 mg Oral Daily Tanja Gonzalez MD   5 mg at 04/29/25 0748    pantoprazole (PROTONIX) EC tablet 40 mg  40 mg Oral QA AC Lucy Chandler MD   40 mg at 04/29/25 0748    tolterodine ER (DETROL LA) 24 hr capsule 2 mg  2 mg Oral Daily Tanja Gonzalez MD   2 mg at 04/29/25 0748     Labs and  Imaging:  New results:   Recent Results (from the past 24 hours)   Basic metabolic panel    Collection Time: 04/28/25  8:31 AM   Result Value Ref Range    Sodium 141 135 - 145 mmol/L    Potassium 4.0 3.4 - 5.3 mmol/L    Chloride 101 98 - 107 mmol/L    Carbon Dioxide (CO2) 28 22 - 29 mmol/L    Anion Gap 12 7 - 15 mmol/L    Urea Nitrogen 19.4 8.0 - 23.0 mg/dL    Creatinine 0.64 0.51 - 0.95 mg/dL    GFR Estimate >90 >60 mL/min/1.73m2    Calcium 9.5 8.8 - 10.4 mg/dL    Glucose 94 70 - 99 mg/dL   Magnesium    Collection Time: 04/28/25  8:31 AM   Result Value Ref Range    Magnesium 1.6 (L) 1.7 - 2.3 mg/dL   Phosphorus    Collection Time: 04/28/25  8:31 AM   Result Value Ref Range    Phosphorus 4.6 (H) 2.5 - 4.5 mg/dL   Extra Purple Top Tube    Collection Time: 04/28/25  8:31 AM   Result Value Ref Range    Hold Specimen JIC    Glucose by meter    Collection Time: 04/28/25 11:42 AM   Result Value Ref Range    GLUCOSE BY METER POCT 118 (H) 70 - 99 mg/dL   Glucose by meter    Collection Time: 04/28/25  5:40 PM   Result Value Ref Range    GLUCOSE BY METER POCT 116 (H) 70 - 99 mg/dL   Glucose by meter    Collection Time: 04/28/25  8:55 PM   Result Value Ref Range    GLUCOSE BY METER POCT 142 (H) 70 - 99 mg/dL   Glucose by meter    Collection Time: 04/29/25  1:55 AM   Result Value Ref Range    GLUCOSE BY METER POCT 135 (H) 70 - 99 mg/dL   Glucose by meter    Collection Time: 04/29/25  7:41 AM   Result Value Ref Range    GLUCOSE BY METER POCT 154 (H) 70 - 99 mg/dL     New Results  Valproic Acid Free & Total: 67  Valproic Acid Free & Total STAT: 75.6  Phosphorus: 4.6 (H)  Magnesium: 1.6 (L)  BMP: unremarkable (4/28)    Data this admission:  CBC: WNL. WBC: 7.6, RBC: 4.37, Hb: 13.0, Platelets: 316.  CMP: Unremarkable (4/22) AST: 13, ALT: 10, Creatinine: 0.65.  UDS: Negative  RASHIDA: 0.00 (4/13)  HbA1c: 10.1%  TSH: 2.40  Vit: B12: 812  Folate: 13.7    Imaging:  Bladder Scan: Completed on 4/22     Mental Status Exam:     Oriented to:   "Grossly oriented.  General: Alert and awake. Pt was sitting on her bed.   Appearance:  Appears younger than stated age. Grooming is unkempt. Wears glasses and own personal clothing.   Behavior/Attitude:  Cooperative, calm, engaged with questioning.   Eye Contact: Appropriate.   Psychomotor: No evidence of tics, dystonia, or tardive dyskinesia. No catatonia present.  Speech: Appropriate rate/tone/volume.   Language: Fluent in English with appropriate syntax and vocabulary.  Mood:  \"drowsy\"  Affect:  Euthymic. Congruent with mood. Appropriate.   Thought Process:  Linear and goal directed.  Thought Content:  Denies SI/SIB/AVH.  Associations:  intact  Insight:  limited   Judgment:  limited   Impulse control: fair  Attention Span:  adequate for conversation  Concentration:  grossly intact  Recent and Remote Memory:  Not formally assessed. Pt reports memory issues. Staff notes forgetfulness.   Fund of Knowledge: Average.  Muscle Strength and Tone: Normal.  Gait and Station: Normal.     Psychiatric Assessment     Anastasiya Simon is a 62 year old female previously diagnosed with schizophrenia and polysubstance use disorder who presented with suicidal ideation, disorganization, and inability to care for self. Most recent psychiatric hospitalization was 03/20/2019 for disorganization and suicidal ideation. Significant symptoms on admission include increasing suicidal ideation, disorganization, erratic behavior such as filling buckets with ammonia and mixing with bleach, substance use, auditory hallucinations, and paranoia. The MSE on admission was pertinent for AVH, paranoia, limited insight into MH. Today on interview Anastasiya denies SI and reports she had only made suicidal statements to her sisters previously as she did not want to move into a group home.  Psychological contributions to mental health presentation include maladaptive coping through substance use and limited insight into MH. Social factors contributing to " mental health presentation include interpersonal conflicts and unstable living environment. Protective factors include support from two sisters and absence of previous suicide attempts. On 4/16 we resumed Zyprexa 20 mg and increased Depakote to 1000 mg to address the ongoing psychosis, Tolterodine and PRN loperamide were started to address incontinence and diarrhea. On 4/18, we added PRN gabapentin 300 mg for peripheral neuropathy. On 4/28, we will begin cross-titration to Abilify from Zyprexa due to increased appetite noted while on Zyprexa. Started Abilify 5 mg PO daily in the AM.    In summary, the patient's reported symptoms of SI, erractic behavior, AVH, and paranoia in the context of psychosocial stressers are consistent with decompensated schizophrenia and polysubstance use disorder.  She will likely benefit from medication optimization and outpatient MH referrals this admission.     Psychiatric Plan by Diagnosis      Today's changes:  - None    # Schizophrenia  Medications:  - olanzapine 5 mg in the morning, 15 mg HS  - depakote 1000 mg HS  - Abilify 5 mg PO daily in the morning    #Polysubstance Use Disorder  - CD consult once psychotic sx more stable, if pt amenable    2. Pertinent Labs/Monitoring:  - Labs done 04/13 at OSH. CBC wnl, glucose elevated to 332, BMP wnl.  - UDS negative on 04/13  - 4/16 HbA1c: 10.1  - Depakote Levels: 75.6    3. Additional Plans:  - Patient will be treated in therapeutic milieu with appropriate individual and group therapies as described.   - Consult with Internal Medicine for diabetes management.      Psychiatric Hospital Course:      Anastasiya Simon was admitted to [facility] on a 72 hour hold (started 4/13/25 21:35) from [facility] on 04/15/25.   Medications:  Continued Medications:  Pt seen by psychiatry consult at [facility] who started zyprexa 20 mg at bedtime and depakote 500 mg at bedtime. Both of these medications were continued on admission to  [facility].   New Medications:  Started loperamide for diarrhea  Started tolterodine for urinary incontinence  Started gabapentin for peripheral neuropathy    4/16: Increased depakote from 500 mg to 1000 mg HS for mood regulation  4/16: Increased metformin from 500 mg to 1000 mg BID per medicine consult  4/16: Zyprexa: 5 mg in the morning and 15 mg HS  4/16: Started PRN Loperamide 2 mg 4x/day for diarrhea  4/16: Started Tolterodine 2 mg daily for urinary incontinence  4/18: Started Gabapentin 300 mg PRN 3x/day for peripheral neuropathy  4/20: Started glipizide 24 hr tablet 5 mg daily for better glycemic control per medicine consult  4/25: Increased glipizide XL from 5mg to 10mg per medicine consult.  4/28: Started Abilify 5 mg PO daily in the morning    The risks, benefits, alternatives, and side effects were discussed and understood by the patient and other caregivers.     Medical Assessment and Plan     Medical diagnoses to be addressed this admission:      #Type 2 Diabetes Mellitis with Complications  - PTA metformin 1000 mg BID with meals  - BID glucose checks; hypoglycemia protocol  - PRN gabapentin 300 mg 3x/day for peripheral neuropathy  - Insulin aspart (Novolog) injection 1-5 units HS  - Insulin aspart (Novolog) injection 3x/day 1-7 units before meals  - Glipizide 24 hr tablet 10 mg daily with breakfast for better glycemic control  - Internal Medicine Consult  - Nutrition Consult  - Moderate Consistent Carb Diet  - Given patient is starting insulin, please notify medicine team of any impending discharge at least 2-3 days prior in order to have a safe management plan for her diabetes.    #Diarrhea  - Loperamide 2 mg PRN 4x/day    #Urinary Incontinence  - Tolterodine 2 mg daily    Medical course:  Patient was physically examined by the ED prior to being transferred to the unit and was found to be medically stable and appropriate for admission.    Consults:   4/16: Internal Medicine consulted for management of  diabetes and blood pressure.   4/16: Increased metformin to 1000 mg BID.   4/18: Started PRN gabapentin 300 mg 3x/day for peripheral neuropathy.   4/18: Internal Medicine started Novolog injection 1-5 units HS and Novolog injection 3x/day 1-7 units before meals for better glycemic control. Given patient is starting insulin, please notify medicine team of any impending discharge at least 2-3 days prior in order to have a safe management plan for her diabetes.  4/19: Internal Medicine increased to high intensity sliding scale. Novolog injection 1-7 units HS. Novolog injection 3x/day 1-10 units before meals for better glycemic control.   4/20: Started PO glipizide 24 hr tablet 5 mg daily with breakfast.   4/21: Started pt on a consistent carb diet.   4/22: Nutrition Consult for diabetes management.   4/24: Note from Medicine: Please send patient with glucometer on discharge. Diabetes education should be ordered prior to discharge.  4/25: Increase Glipizide XL to 10 mg daily starting on 4/26. Continue Metformin 1000 mg BID. Decreased to moderate intensity sliding scale. Novolog injection 1-5 units HS. Novolog injection 3x/day 1-7 units before meals.. Hopefully can discontinue in coming days. Moderate consistent CHO diet.   4/27: Continues to require 7U of insulin and is not quite ready to wean off of her sliding scale insulin yet. Will continue to monitor and adjust medications with goal of having her wean off sliding scale insulin prior to discharge.     Checklist     Legal Status:   MI Commitment and [facility]: Fariha  File Number: [id number]  Start and expiration date of commitment: 04/24/2025 - 10/24/2025  Castellanos meds: Zyprexa, Haldol, Abilify, Risperdal    Safety Assessment:   Behavioral Orders   Procedures    Code 1 - Restrict to Unit    Routine Programming     As clinically indicated    Status 15     Every 15 minutes.    Suicide precautions: Suicide Risk: MODERATE; Clinical rationale to override  score: modification to the care environment, response to medication, lack of access to a plan for self-harm, Exhibiting Suicidal/self-harm behaviors or thoughts     Patients on Suicide Precautions should have a Combination Diet ordered that includes a Diet selection(s) AND a Behavioral Tray selection for Safe Tray - with utensils, or Safe Tray - NO utensils       Order Specific Question:   Suicide Risk     Answer:   MODERATE     Order Specific Question:   Clinical rationale to override score:     Answer:   modification to the care environment     Order Specific Question:   Clinical rationale to override score:     Answer:   response to medication     Order Specific Question:   Clinical rationale to override score:     Answer:   lack of access to a plan for self-harm     Order Specific Question:   Clinical rationale to override score:     Answer:   Exhibiting Suicidal/self-harm behaviors or thoughts     Risk Assessment:  Risk for harm is moderate.  Risk factors: maladaptive coping, substance use, impulsive, and past behaviors.  Protective factors: family.    SIO: None    Disposition: TBD. Disposition pending clinical stabilization, medication optimization and development of an appropriate discharge plan.     Attestations     Tatyana Mcclure, MS3, [facility] Medical Student     I was present with the medical student who participated in the service and in the documentation of the note. I have verified the history and personally performed the exam and medical decision making. I agree with the assessment and plan of care as documented in the note. Joceline Dela Cruz M.D., Ph.D.        Yes